# Patient Record
Sex: FEMALE | Race: ASIAN
[De-identification: names, ages, dates, MRNs, and addresses within clinical notes are randomized per-mention and may not be internally consistent; named-entity substitution may affect disease eponyms.]

---

## 2021-10-28 ENCOUNTER — APPOINTMENT (OUTPATIENT)
Dept: UROLOGY | Facility: CLINIC | Age: 31
End: 2021-10-28

## 2021-12-21 ENCOUNTER — APPOINTMENT (OUTPATIENT)
Dept: UROLOGY | Facility: CLINIC | Age: 31
End: 2021-12-21
Payer: COMMERCIAL

## 2021-12-21 VITALS
TEMPERATURE: 98 F | DIASTOLIC BLOOD PRESSURE: 63 MMHG | RESPIRATION RATE: 18 BRPM | SYSTOLIC BLOOD PRESSURE: 102 MMHG | HEIGHT: 63.78 IN | HEART RATE: 72 BPM | OXYGEN SATURATION: 99 % | BODY MASS INDEX: 25.54 KG/M2 | WEIGHT: 147.8 LBS

## 2021-12-21 DIAGNOSIS — R10.9 UNSPECIFIED ABDOMINAL PAIN: ICD-10-CM

## 2021-12-21 DIAGNOSIS — Z78.9 OTHER SPECIFIED HEALTH STATUS: ICD-10-CM

## 2021-12-21 DIAGNOSIS — N20.1 CALCULUS OF URETER: ICD-10-CM

## 2021-12-21 DIAGNOSIS — Z00.00 ENCOUNTER FOR GENERAL ADULT MEDICAL EXAMINATION W/OUT ABNORMAL FINDINGS: ICD-10-CM

## 2021-12-21 PROCEDURE — 99203 OFFICE O/P NEW LOW 30 MIN: CPT

## 2021-12-22 NOTE — HISTORY OF PRESENT ILLNESS
[FreeTextEntry1] : Please refer to URO Consult note \par \par left ureteral stone \par left flank pain in May \par saw Dr. Mccullough \par previous US and KUB showed no stone \par concerns stone is still there \par consider repeat CT scan \par

## 2021-12-22 NOTE — ADDENDUM
[FreeTextEntry1] : Entered by Juan Godfrey, acting as scribe for Dr. Emeka Zimmerman.\par \par The documentation recorded by the scribe accurately reflects the service I personally performed and the decisions made by me.

## 2021-12-22 NOTE — LETTER BODY
[FreeTextEntry1] : Pari Gorman MD\par [address], [address],\[place] [address]\par [phone]\par \par Dear Dr. Gorman, \par \par Reason for Visit: Previous left ureteral stone. Left flank pain. \par \par This is a 31 year-old English-speaking woman with previous left ureteral stone and left flank pain. Patient is referred for evaluation of her condition. She reports she was previously hospitalized at [facility] in May 2021 for left flank pain. Her abdominal imaging demonstrated a 6 mm left ureteral stone. Her stone has since resolved. Her KUB x -ray in July 2021 showed no evidence of stones. Her renal ultrasound in November 2021 demonstrated no evidence of stones, masses or lesions in either kidney. The patient complains of left flank pain. She expresses concern that her left ureteral stone is still present.  Patient denies any gross hematuria or dysuria or urinary incontinence. The patient denies any aggravating or relieving factors. The patient denies any interference of function. The patient is entirely asymptomatic. All other review of systems are negative. She has no cancer in her family medical history. She has no previous surgical history. Past medical history, family history and social history were inquired and were noncontributory to current condition. The patient does not use tobacco. She drinks alcohol. Medications and allergies were reviewed. She has no known allergies to medication. \par \par On examination, the patient is a healthy-appearing woman in no acute distress. She is alert and oriented and follows commands. She  has normal mood and affect. She is normocephalic. Oral no thrush. Neck is supple. Respirations are unlabored. Abdomen is soft and nontender. Liver is nonpalpable. Bladder is nonpalpable. No CVA tenderness. Neurologically she is grossly intact. No peripheral edema. Skin without gross abnormality.\par \par Her KUB x-ray showed no evidence of stones. \par \par I personally reviewed ultrasound images with the patient today and images demonstrated no evidence of stones, lesions or masses. \par Assessment: Left flank pain. \par \par I counseled the patient. I discussed the various etiologies of her symptoms. I reassured the patient. Her KUB x-ray and ultrasound showed no evidence of stones. If her flank pain persists, I encouraged the patient to consider undergoing CT scan for further evaluation.  I recommended the patient undergo urinalysis and urine culture today to evaluate for infection. Patient understands that if she develops gross hematuria or any urinary discomfort, she will contact me for further evaluation.  Risks and alternatives were discussed. I answered the patient's questions. The patient will follow-up as directed and will contact me with any questions or concerns. Thank you for the opportunity to participate in the care of Ms. BONE. I will keep you updated on her progress.\par \par Plan: Urinalysis. Urine culture. Follow up as directed.

## 2021-12-23 LAB
APPEARANCE: CLEAR
BACTERIA UR CULT: NORMAL
BACTERIA: NEGATIVE
BILIRUBIN URINE: NEGATIVE
BLOOD URINE: NEGATIVE
COLOR: YELLOW
GLUCOSE QUALITATIVE U: NEGATIVE
HYALINE CASTS: 1 /LPF
KETONES URINE: ABNORMAL
LEUKOCYTE ESTERASE URINE: ABNORMAL
MICROSCOPIC-UA: NORMAL
NITRITE URINE: NEGATIVE
PH URINE: 6.5
PROTEIN URINE: NORMAL
RED BLOOD CELLS URINE: 6 /HPF
SPECIFIC GRAVITY URINE: 1.03
SQUAMOUS EPITHELIAL CELLS: 3 /HPF
UROBILINOGEN URINE: NORMAL
WHITE BLOOD CELLS URINE: 6 /HPF

## 2024-06-30 ENCOUNTER — EMERGENCY (EMERGENCY)
Facility: HOSPITAL | Age: 34
LOS: 0 days | Discharge: HOME | End: 2024-06-30
Attending: EMERGENCY MEDICINE
Payer: MEDICAID

## 2024-06-30 ENCOUNTER — EMERGENCY (EMERGENCY)
Facility: HOSPITAL | Age: 34
LOS: 0 days | Discharge: HOME | End: 2024-06-30
Attending: EMERGENCY MEDICINE
Payer: COMMERCIAL

## 2024-06-30 VITALS
RESPIRATION RATE: 16 BRPM | DIASTOLIC BLOOD PRESSURE: 74 MMHG | OXYGEN SATURATION: 99 % | SYSTOLIC BLOOD PRESSURE: 122 MMHG | TEMPERATURE: 98 F | WEIGHT: 119.93 LBS | HEART RATE: 72 BPM

## 2024-06-30 VITALS
HEART RATE: 84 BPM | WEIGHT: 115.08 LBS | OXYGEN SATURATION: 98 % | DIASTOLIC BLOOD PRESSURE: 99 MMHG | RESPIRATION RATE: 16 BRPM | TEMPERATURE: 98 F | SYSTOLIC BLOOD PRESSURE: 141 MMHG

## 2024-06-30 DIAGNOSIS — S01.551A OPEN BITE OF LIP, INITIAL ENCOUNTER: ICD-10-CM
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
## 2024-06-30 DIAGNOSIS — S50.812A ABRASION OF LEFT FOREARM, INITIAL ENCOUNTER: ICD-10-CM
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
## 2024-06-30 PROCEDURE — 99283 EMERGENCY DEPT VISIT LOW MDM: CPT

## 2024-06-30 PROCEDURE — 99284 EMERGENCY DEPT VISIT MOD MDM: CPT

## 2024-06-30 RX ORDER — TETANUS TOXOID, REDUCED DIPHTHERIA TOXOID AND ACELLULAR PERTUSSIS VACCINE, ADSORBED 5; 2.5; 8; 8; 2.5 [IU]/.5ML; [IU]/.5ML; UG/.5ML; UG/.5ML; UG/.5ML
0.5 SUSPENSION INTRAMUSCULAR ONCE
Refills: 0 | Status: DISCONTINUED | OUTPATIENT
Start: 2024-06-30 | End: 2024-06-30

## 2024-06-30 RX ORDER — AMOXICILLIN/POTASSIUM CLAV 250-125 MG
875 TABLET ORAL
Qty: 20 | Refills: 0
Start: 2024-06-30 | End: 2024-07-09